# Patient Record
Sex: FEMALE | Race: OTHER | HISPANIC OR LATINO | URBAN - METROPOLITAN AREA
[De-identification: names, ages, dates, MRNs, and addresses within clinical notes are randomized per-mention and may not be internally consistent; named-entity substitution may affect disease eponyms.]

---

## 2022-08-15 ENCOUNTER — APPOINTMENT (EMERGENCY)
Dept: RADIOLOGY | Facility: HOSPITAL | Age: 67
End: 2022-08-15
Payer: COMMERCIAL

## 2022-08-15 ENCOUNTER — APPOINTMENT (EMERGENCY)
Dept: CT IMAGING | Facility: HOSPITAL | Age: 67
End: 2022-08-15
Payer: COMMERCIAL

## 2022-08-15 ENCOUNTER — HOSPITAL ENCOUNTER (EMERGENCY)
Facility: HOSPITAL | Age: 67
Discharge: HOME | End: 2022-08-15
Attending: EMERGENCY MEDICINE | Admitting: EMERGENCY MEDICINE
Payer: COMMERCIAL

## 2022-08-15 VITALS
OXYGEN SATURATION: 98 % | WEIGHT: 113.98 LBS | SYSTOLIC BLOOD PRESSURE: 188 MMHG | DIASTOLIC BLOOD PRESSURE: 88 MMHG | HEART RATE: 81 BPM | RESPIRATION RATE: 18 BRPM | TEMPERATURE: 97.6 F

## 2022-08-15 DIAGNOSIS — S01.81XA: ICD-10-CM

## 2022-08-15 DIAGNOSIS — S02.2XXA NASAL BONE FRACTURE: ICD-10-CM

## 2022-08-15 DIAGNOSIS — S00.31XA NASAL ABRASION: ICD-10-CM

## 2022-08-15 DIAGNOSIS — S29.019A THORACIC MYOFASCIAL STRAIN: ICD-10-CM
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
## 2022-08-15 DIAGNOSIS — S09.90XA CHI (CLOSED HEAD INJURY): Primary | ICD-10-CM

## 2022-08-15 PROCEDURE — G1004 CDSM NDSC: HCPCS

## 2022-08-15 PROCEDURE — 73564 X-RAY EXAM KNEE 4 OR MORE: CPT

## 2022-08-15 PROCEDURE — 12052 INTMD RPR FACE/MM 2.6-5.0 CM: CPT | Performed by: EMERGENCY MEDICINE

## 2022-08-15 PROCEDURE — 99284 EMERGENCY DEPT VISIT MOD MDM: CPT | Performed by: EMERGENCY MEDICINE

## 2022-08-15 PROCEDURE — 72131 CT LUMBAR SPINE W/O DYE: CPT

## 2022-08-15 PROCEDURE — 99284 EMERGENCY DEPT VISIT MOD MDM: CPT

## 2022-08-15 PROCEDURE — 70450 CT HEAD/BRAIN W/O DYE: CPT

## 2022-08-15 PROCEDURE — 72125 CT NECK SPINE W/O DYE: CPT

## 2022-08-15 PROCEDURE — 72128 CT CHEST SPINE W/O DYE: CPT

## 2022-08-15 PROCEDURE — 70486 CT MAXILLOFACIAL W/O DYE: CPT

## 2022-08-15 RX ORDER — GINSENG 100 MG
2 CAPSULE ORAL ONCE
Status: COMPLETED | OUTPATIENT
Start: 2022-08-15 | End: 2022-08-15

## 2022-08-15 RX ORDER — LIDOCAINE HYDROCHLORIDE AND EPINEPHRINE 10; 10 MG/ML; UG/ML
10 INJECTION, SOLUTION INFILTRATION; PERINEURAL ONCE
Status: COMPLETED | OUTPATIENT
Start: 2022-08-15 | End: 2022-08-15

## 2022-08-15 RX ADMIN — LIDOCAINE HYDROCHLORIDE,EPINEPHRINE BITARTRATE 10 ML: 10; .01 INJECTION, SOLUTION INFILTRATION; PERINEURAL at 19:35

## 2022-08-15 RX ADMIN — BACITRACIN ZINC 2 SMALL APPLICATION: 500 OINTMENT TOPICAL at 19:35

## 2022-08-15 NOTE — ED PROVIDER NOTES
History  Chief Complaint   Patient presents with    Fall     Patient arrives via ems c/o a fall while walking her dog  Patient states going through a construction site and a big machine was driving so she backed up but the  motioned for her to proceed so she did and fell forward  Lac on forehead and nose, abrasions on right knee  Denies loc, unsure about thinnners     68y F biba for evaluation after a mechanical fall pta  Was walking near a construction site when she tripped, fell forward onto knees and face  No LOC  Able to get up on her own  C/o facial pain - forehead and nose  Denies any neck pain  C/o mid back pain (around T7-8), worse w/ movement, no low back pain  Has r>l knee pain, worse w/ movement  Overall pain moderate  Declines pain medication    Noted to have damage to her glasses, but reports she is having some trouble focusing that she thinks is not related to her glasses  Denies double vision  No epistaxis, no pain to lips/teeth    Denies cp, no sob/melgar, no n/v, no other co      History provided by:  Patient and medical records   used: Yes ([id number])    Fall  Mechanism of injury: fall    Injury location:  Face, torso and leg  Facial injury location:  Face and nose  Torso injury location:  Back  Leg injury location:  R knee and L knee  Incident location:  Outdoors  Arrived directly from scene: yes    Fall:     Fall occurred:  Tripped    Height of fall:  Ground level fall    Impact surface:  [place]    Point of impact:  Face and knees    Entrapped after fall: no    Protective equipment: none    Suspicion of alcohol use: no    Suspicion of drug use: no    Tetanus status:  Up to date (2016)  Prior to arrival data:     Bystander interventions:  None    Blood loss:  Minimal    Responsiveness at scene:  Alert    Orientation at scene:  Person, place, situation and time    Loss of consciousness: no      Amnesic to event: no    Associated symptoms: back pain and headaches Associated symptoms: no abdominal pain, no chest pain, no difficulty breathing, no loss of consciousness, no nausea, no neck pain and no vomiting    Risk factors: no anticoagulation therapy (no anti-platelet therapy)        None       History reviewed  No pertinent past medical history  History reviewed  No pertinent surgical history  History reviewed  No pertinent family history  I have reviewed and agree with the history as documented  E-Cigarette/Vaping     E-Cigarette/Vaping Substances     Social History     Tobacco Use    Smoking status: Never Smoker    Smokeless tobacco: Never Used   Substance Use Topics    Alcohol use: Never    Drug use: Never       Review of Systems   Cardiovascular: Negative for chest pain  Gastrointestinal: Negative for abdominal pain, nausea and vomiting  Musculoskeletal: Positive for back pain  Negative for neck pain  Neurological: Positive for headaches  Negative for loss of consciousness  All other systems reviewed and are negative  Physical Exam  Physical Exam  Vitals and nursing note reviewed  Constitutional:       Appearance: Normal appearance  HENT:      Head: Normocephalic  No raccoon eyes or Pfeiffer's sign  Right Ear: No hemotympanum  Left Ear: No hemotympanum  Nose: Signs of injury and nasal tenderness present  No nasal deformity or septal deviation  Right Nostril: No epistaxis or septal hematoma  Left Nostril: No epistaxis or septal hematoma  Mouth/Throat:      Mouth: Mucous membranes are moist       Comments: No malocclusion  Eyes:      Extraocular Movements: Extraocular movements intact  Conjunctiva/sclera: Conjunctivae normal       Pupils: Pupils are equal, round, and reactive to light  Cardiovascular:      Rate and Rhythm: Normal rate and regular rhythm  Heart sounds: No murmur heard  Pulmonary:      Effort: Pulmonary effort is normal       Breath sounds: Normal breath sounds     Chest:      Chest wall: No tenderness  Abdominal:      Palpations: Abdomen is soft  Tenderness: There is no abdominal tenderness  Musculoskeletal:         General: Tenderness present  Cervical back: Tenderness and bony tenderness present  Spinous process tenderness and muscular tenderness present  Thoracic back: Bony tenderness present  Lumbar back: No bony tenderness  Back:       Right knee: Swelling and bony tenderness (patella) present  No deformity or ecchymosis  Decreased range of motion  Tenderness present  No medial joint line or lateral joint line tenderness  Left knee: No swelling or bony tenderness  Normal range of motion  No tenderness  Skin:     General: Skin is warm  Findings: Abrasion present  Neurological:      General: No focal deficit present  Mental Status: She is alert and oriented to person, place, and time  Cranial Nerves: Cranial nerves are intact  Sensory: Sensation is intact  Motor: Motor function is intact     Psychiatric:         Mood and Affect: Mood normal          Vital Signs  ED Triage Vitals [08/15/22 1707]   Temperature Pulse Respirations Blood Pressure SpO2   97 6 °F (36 4 °C) 81 18 (!) 188/88 98 %      Temp Source Heart Rate Source Patient Position - Orthostatic VS BP Location FiO2 (%)   Oral Monitor Lying Right arm --      Pain Score       --           Vitals:    08/15/22 1707   BP: (!) 188/88   Pulse: 81   Patient Position - Orthostatic VS: Lying         Visual Acuity  Visual Acuity    Flowsheet Row Most Recent Value   L Pupil Size (mm) 3   R Pupil Size (mm) 3          ED Medications  Medications   lidocaine-epinephrine (XYLOCAINE/EPINEPHRINE) 1 %-1:100,000 injection 10 mL (10 mL Infiltration Given by Other 8/15/22 1935)   bacitracin topical ointment 2 small application (2 small application Topical Given 8/15/22 1935)       Diagnostic Studies  Results Reviewed     None                 XR knee 4+ views Right injury   ED Interpretation by Claudia Viveros DO (08/15 1842)   No acute findings      CT spine thoracic & lumbar wo contrast   ED Interpretation by Claudia Viveros DO (08/15 1931)   See below      Final Result by Carlos Tobar MD (08/15 1853)      No evidence of acute thoracic or lumbar spine fracture  Workstation performed: [id number]         CT head without contrast   ED Interpretation by Claudia Viveros DO (08/15 1843)   See below      Final Result by Sarah Germain DO (08/15 1832)      No acute intracranial abnormality  Workstation performed: [id number]         CT facial bones without contrast   ED Interpretation by Claudia Viveros DO (08/15 1911)   See below      Final Result by Carlos Tobar MD (08/15 1851)      Left nasal bone fracture  Workstation performed: [id number]         CT cervical spine without contrast   ED Interpretation by Claudia Viveros DO (08/15 1931)   See below      Final Result by Carlos Tobar MD (08/15 1855)      No acute cervical spine fracture or traumatic malalignment  Workstation performed: [id number]                    Procedures  Laceration repair    Date/Time: 8/15/2022 8:25 PM  Performed by: Claudia Viveros DO  Authorized by: Claudia Viveros DO   Consent: Verbal consent obtained    Consent given by: patient  Patient identity confirmed: verbally with patient  Body area: head/neck  Location details: forehead  Laceration length: 3 cm  Tendon involvement: none  Nerve involvement: none  Vascular damage: no  Anesthesia: local infiltration    Anesthesia:  Local Anesthetic: lidocaine 1% with epinephrine  Anesthetic total: 8 mL    Sedation:  Patient sedated: no      Wound Dehiscence:    Secondary closure or dehiscence: complex    Procedure Details:  Irrigation solution: saline  Amount of cleaning: standard  Debridement: none  Degree of undermining: minimal  Skin closure: 6-0 nylon  Subcutaneous closure: 5-0 Vicryl  Number of sutures: 12 (9 nylon interrupted for skin closure, 3 SQ vicryl)  Technique: simple and buried suture  Approximation: close  Approximation difficulty: complex  Dressing: antibiotic ointment  Patient tolerance: patient tolerated the procedure well with no immediate complications  Foreign body: no foreign bodies/debris appreciated  ED Course  ED Course as of 08/15/22 2234   Mon Aug 15, 2022   1931 CT spine thoracic & lumbar wo contrast  TT sent to radiology (Dr Romain Galo)   2005 D/w pt rad results  Reports known issue w/ thoracic vertebral bodies from 2014 (Concavity of the superior T8 and T9 endplates and small Schmorl's nodes, consistent with degenerative change on CT tonight)   2050 D/w pt wound care, follow up     Given return precautions, wound care instructions / suture removal information in both english and Mont[place]erratian                               SBIRT 20yo+    Flowsheet Row Most Recent Value   SBIRT (23 yo +)    In order to provide better care to our patients, we are screening all of our patients for alcohol and drug use  Would it be okay to ask you these screening questions? Yes Filed at: 08/15/2022 1711   Initial Alcohol Screen: US AUDIT-C     1  How often do you have a drink containing alcohol? 0 Filed at: 08/15/2022 1711   2  How many drinks containing alcohol do you have on a typical day you are drinking? 0 Filed at: 08/15/2022 1711   3a  Male UNDER 65: How often do you have five or more drinks on one occasion? 0 Filed at: 08/15/2022 1711   3b  FEMALE Any Age, or MALE 65+: How often do you have 4 or more drinks on one occassion? 0 Filed at: 08/15/2022 1711   Audit-C Score 0 Filed at: 08/15/2022 1711   DARRIN: How many times in the past year have you    Used an illegal drug or used a prescription medication for non-medical reasons?  Never Filed at: 08/15/2022 1711                    MDM  Number of Diagnoses or Management Options  CHI (closed head injury): new and requires workup  Complex laceration of forehead: new and requires workup  Fall from standing: new and requires workup  Nasal abrasion: new and requires workup  Nasal bone fracture: new and requires workup  Thoracic myofascial strain: new and requires workup  Diagnosis management comments: Mechanical fall       Amount and/or Complexity of Data Reviewed  Tests in the radiology section of CPT®: ordered and reviewed  Independent visualization of images, tracings, or specimens: yes        Disposition  Final diagnoses:   CHI (closed head injury)   Complex laceration of forehead   Nasal bone fracture   Nasal abrasion   Thoracic myofascial strain   Fall from standing     Time reflects when diagnosis was documented in both MDM as applicable and the Disposition within this note     Time User Action Codes Description Comment    8/15/2022  8:59 PM Colon Dory Add [S09 90XA] CHI (closed head injury)     8/15/2022  8:59 PM Leonarda Jeffery Add [S01 81XA] Complex laceration of forehead     8/15/2022  9:00 PM Leonarda Jeffery Add [S02  2XXA] Nasal bone fracture     8/15/2022  9:00 PM Leonarda Jeffery Add [S00 31XA] Nasal abrasion     8/15/2022  9:01 PM Leonarda Jeffery Add [U98 925A] Thoracic myofascial strain     8/15/2022  9:05 PM Leonarda Jeffery Add [O28  XXXA] Fall from standing       ED Disposition     ED Disposition   Discharge    Condition   Stable    Date/Time   Mon Aug 15, 2022  8:59 PM    Comment   Renetta Crespo discharge to home/self care  Follow-up Information     Follow up With Specialties Details Why Contact Info    Knute Kawasaki, [facility], Nurse Practitioner Schedule an appointment as soon as possible for a visit on 8/22/2022 For suture removal [address] [facility]  [phone]            There are no discharge medications for this patient  No discharge procedures on file      PDMP Review     None          ED Provider  Electronically Signed by           Dilcia Tinoco [address] [facility], DO  08/15/22 2549

## 2022-08-16 NOTE — DISCHARGE INSTRUCTIONS
Mantenga chakraborty vendaje puesto hasta el ghassan por la noche; después de [place], lave el área suavemente con agua y [place], [place] pequeña cantidad de pomada antibiótica tópica y [place] con un vendaje  A partir del miércoles, sigue lavándote a diario con agua y [place], mario puedes dejarlo al aire o cubrirlo con un vendaje seco (no más pomada antibiótica tópica)  Dorothy un seguimiento con chakraborty médico de atención primaria el lunes para que le quiten los puntos  Regrese al departamento de emergencias o consulte a chakraborty médico de atención primaria antes si tiene enrojecimiento, hinchazón, drenaje, fiebre de más de 100 4 o si tiene alguna inquietud  Puede alternar paracetamol 1000 mg e ibuprofeno 600 mg cada 3 horas para el dolor  Trate de programar chakraborty ibuprofeno para que pueda tomarlo cuando coma  Además, puede aplicar hielo chandrakant 15 a 20 minutos cada 1 a 2 horas mientras está despierto para un control adicional del dolor  Keep your dressing on until Tuesday evening - after you remove it, wash the area gently with soap and water, apply a small amount of topical antibiotic ointment, and cover with a bandage  Starting Wednesday, continue to wash daily with soap and water, but you can leave it open to air or cover with a dry bandage (no more topical antibiotic ointment)  Follow up with your primary care doctor on Monday for removal of your stitches  Return to the Emergency Department or see your primary care doctor sooner for any redness, swelling, drainage, fever more than 100 4, or for any concerns  You can alternate acetaminophen 1000mg and ibuprofen 600mg every 3 hours for pain  Try to time your ibuprofen so you can take it when you eat  Additionally you can apply ice for 15-20 minutes every 1-2 hours while awake for additional pain control

## 2022-08-22 ENCOUNTER — HOSPITAL ENCOUNTER (EMERGENCY)
Facility: HOSPITAL | Age: 67
Discharge: HOME | End: 2022-08-22
Attending: EMERGENCY MEDICINE
Payer: COMMERCIAL

## 2022-08-22 VITALS
TEMPERATURE: 98.2 F | DIASTOLIC BLOOD PRESSURE: 72 MMHG | OXYGEN SATURATION: 98 % | HEART RATE: 74 BPM | RESPIRATION RATE: 18 BRPM | WEIGHT: 114.2 LBS | SYSTOLIC BLOOD PRESSURE: 159 MMHG

## 2022-08-22 DIAGNOSIS — L08.9 WOUND INFECTION: ICD-10-CM

## 2022-08-22 DIAGNOSIS — T14.8XXA WOUND INFECTION: ICD-10-CM

## 2022-08-22 DIAGNOSIS — Z48.02 ENCOUNTER FOR REMOVAL OF SUTURES: Primary | ICD-10-CM

## 2022-08-22 PROCEDURE — 99281 EMR DPT VST MAYX REQ PHY/QHP: CPT | Performed by: PHYSICIAN ASSISTANT

## 2022-08-22 RX ORDER — BACITRACIN, NEOMYCIN, POLYMYXIN B 400; 3.5; 5 [USP'U]/G; MG/G; [USP'U]/G
1 OINTMENT TOPICAL ONCE
Status: COMPLETED | OUTPATIENT
Start: 2022-08-22 | End: 2022-08-22

## 2022-08-22 RX ORDER — CEPHALEXIN 500 MG/1
500 CAPSULE ORAL EVERY 6 HOURS SCHEDULED
Qty: 28 CAPSULE | Refills: 0 | Status: SHIPPED | OUTPATIENT
Start: 2022-08-22 | End: 2022-08-29

## 2022-08-22 RX ADMIN — BACITRACIN ZINC, NEOMYCIN, POLYMYXIN B 1 SMALL APPLICATION: 400; 3.5; 5 OINTMENT TOPICAL at 14:54

## 2022-08-22 NOTE — DISCHARGE INSTRUCTIONS
Take Keflex as prescribed for full 7 days  Keep the area clean, gently wash with soap and water  Apply Neosporin up to 2 times a day and keep the area protected from the sun    Follow-up with PCP for monitoring of symptoms  Return to ED if symptoms worsen including increasing pain, swelling, pus draining from the wound, fevers, chills, headache, vision changes, dizziness, numbness, tingling, weakness

## 2022-08-22 NOTE — ED PROVIDER NOTES
History  Chief Complaint   Patient presents with    Suture / Staple Removal     Pt here to have stitches removed on her forehead that were placed last Monday, tried to get into her pcp to have them removed but there are no appts      Patient is a 56-year-old female who presents for suture removal on her forehead  Patient was seen in the ED on 08/15, 7 days ago, for mechanical fall with facial laceration and abrasion  Patient had CT head and face with left nasal bone fracture, no other acute findings  Patient had a total of 12 sutures place, 9 superficially for forehead laceration  Patient states the area has been healing well and denies any swelling, purulent drainage, fevers, chills, diaphoresis  Today patient did note some mild redness around the wound  Patient states she is otherwise in her usual state of health and denies any headache, nausea, vomiting  Patient does note intermittent dizziness, which only occurs with position change, but states this is unchanged from her baseline prior to the fall  She denies any associated room spinning, vision changes, double vision, neck pain or stiffness, numbness, tingling, focal weakness  Patient denies any congestion, cough, shortness of breath, chest pain, palpitations, abdominal pain, diarrhea, urinary changes  None       No past medical history on file  No past surgical history on file  No family history on file  I have reviewed and agree with the history as documented  E-Cigarette/Vaping     E-Cigarette/Vaping Substances     Social History     Tobacco Use    Smoking status: Never Smoker    Smokeless tobacco: Never Used   Substance Use Topics    Alcohol use: Never    Drug use: Never       Review of Systems   Constitutional: Negative for chills, diaphoresis and fever  HENT: Negative for congestion  Eyes: Negative for photophobia and visual disturbance  Respiratory: Negative for cough and shortness of breath      Cardiovascular: Negative for chest pain  Gastrointestinal: Negative for abdominal pain, diarrhea, nausea and vomiting  Genitourinary: Negative for difficulty urinating, dysuria and hematuria  Musculoskeletal: Negative for myalgias, neck pain and neck stiffness  Skin: Positive for wound (Forehead laceration, healing  Nasal abrasion, healing)  Negative for color change, pallor and rash  Neurological: Positive for dizziness (baseline, unchanged)  Negative for weakness, light-headedness, numbness and headaches  All other systems reviewed and are negative  Physical Exam  Physical Exam  Vitals and nursing note reviewed  Constitutional:       General: She is awake  She is not in acute distress  Appearance: She is well-developed  She is not ill-appearing, toxic-appearing or diaphoretic  HENT:      Head: Normocephalic  Laceration present  Right Ear: External ear normal       Left Ear: External ear normal       Nose: Signs of injury (Healing nasal abrasion  No surrounding swelling, erythema, purulent drainage noted ) present  Eyes:      General: Vision grossly intact  No scleral icterus  Extraocular Movements: Extraocular movements intact  Conjunctiva/sclera: Conjunctivae normal       Pupils: Pupils are equal, round, and reactive to light  Neck:      Vascular: No JVD  Trachea: No tracheal deviation  Cardiovascular:      Rate and Rhythm: Normal rate and regular rhythm  Heart sounds: Normal heart sounds  Pulmonary:      Effort: Pulmonary effort is normal  No tachypnea or respiratory distress  Breath sounds: Normal breath sounds  Musculoskeletal:         General: No deformity  Normal range of motion  Cervical back: Normal range of motion and neck supple  Comments: Neurovascularly intact, moving all extremities freely, ambulating without issue  Skin:     General: Skin is dry  Neurological:      General: No focal deficit present        Mental Status: She is alert and oriented to person, place, and time  GCS: GCS eye subscore is 4  GCS verbal subscore is 5  GCS motor subscore is 6  Cranial Nerves: Cranial nerves are intact  Sensory: Sensation is intact  Motor: Motor function is intact  Coordination: Coordination is intact  Psychiatric:         Behavior: Behavior normal  Behavior is cooperative  Vital Signs  ED Triage Vitals [08/22/22 1344]   Temperature Pulse Respirations Blood Pressure SpO2   98 2 °F (36 8 °C) 74 18 159/72 98 %      Temp src Heart Rate Source Patient Position - Orthostatic VS BP Location FiO2 (%)   -- -- Sitting Right arm --      Pain Score       --           Vitals:    08/22/22 1344   BP: 159/72   Pulse: 74   Patient Position - Orthostatic VS: Sitting         Visual Acuity      ED Medications  Medications   neomycin-bacitracin-polymyxin b (NEOSPORIN) ointment 1 small application (1 small application Topical Given 8/22/22 1454)       Diagnostic Studies  Results Reviewed     None                 No orders to display              Procedures  Suture removal    Date/Time: 8/22/2022 2:45 PM  Performed by: Jeff Escamilla PA-C  Authorized by: Jeff Escamilla PA-C   Universal Protocol:  Consent: Verbal consent obtained  Risks and benefits: risks, benefits and alternatives were discussed  Consent given by: patient  Time out: Immediately prior to procedure a "time out" was called to verify the correct patient, procedure, equipment, support staff and site/side marked as required  Timeout called at: 8/22/2022 2:35 PM   Patient understanding: patient states understanding of the procedure being performed  Patient identity confirmed: verbally with patient        Patient location:  ED  Location:     Laterality:  Median    Location:  [address] [facility] location:  Forehead  Procedure details:      Tools used:  Suture removal kit    Wound appearance:  Red, warm, good wound healing, clean and nonpurulent    Number of sutures removed: 9  Post-procedure details:     Post-removal:  Antibiotic ointment applied    Patient tolerance of procedure: Tolerated well, no immediate complications             ED Course                               SBIRT 20yo+    Flowsheet Row Most Recent Value   SBIRT (25 yo +)    In order to provide better care to our patients, we are screening all of our patients for alcohol and drug use  Would it be okay to ask you these screening questions? No Filed at: 08/22/2022 0562                    [facility]  Number of Diagnoses or Management Options  Encounter for removal of sutures  Wound infection  Diagnosis management comments: Sutures successfully removed, patient tolerated procedure well  Given trace erythema and warmth, will cover for possible early wound infection  Reviewed medication education, treatment at home  Recommended follow-up with PCP for monitoring of wound healing and symptoms  Patient with chronic history of dizziness, which patient states is unchanged by recent events, consistent with prior outpatient visits in chart review  The management plan was discussed in detail with the patient at bedside and all questions were answered  Provided both verbal and written instructions  Reviewed red flag symptoms and strict return to ED instructions  Patient notes understanding and agrees to plan  Disposition  Final diagnoses:   Encounter for removal of sutures   Wound infection     Time reflects when diagnosis was documented in both MDM as applicable and the Disposition within this note     Time User Action Codes Description Comment    8/22/2022  2:48 PM Janice Cota Add [M92 84] Encounter for removal of sutures     8/22/2022  2:49 PM Grabiel Galo  8XXA,  L08 9] Wound infection       ED Disposition     ED Disposition   Discharge    Condition   Stable    Date/Time   Mon Aug 22, 2022  2:49 PM    Comment   Freida Crespo discharge to home/self care                 Follow-up Information     Follow up With Specialties Details Why Contact Info Additional [address] [facility] Emergency Department Emergency Medicine  If symptoms worsen [place] [id number]  [address] [facility] Emergency Department, [address]  , [place], [place], [address]    Follow-up with PCP for monitoring of symptoms               Discharge Medication List as of 8/22/2022  2:56 PM      START taking these medications    Details   cephalexin (KEFLEX) 500 mg capsule Take 1 capsule (500 mg total) by mouth every 6 (six) hours for 7 days, Starting Mon 8/22/2022, Until Mon 8/29/2022, Print             No discharge procedures on file      PDMP Review     None          ED Provider  Electronically Signed by           Sully Gonsalves PA-C  08/22/22 4789

## 2022-08-22 NOTE — Clinical Note
Greg Galicia was seen and treated in our emergency department on 8/22/2022  Diagnosis:     Freida    She may return on this date: 08/23/2022         If you have any questions or concerns, please don't hesitate to call        Avery Farias PA-C    ______________________________           _______________          _______________  Hospital Representative                              Date                                Time

## 2022-12-01 ENCOUNTER — TELEPHONE (OUTPATIENT)
Dept: OBGYN CLINIC | Facility: HOSPITAL | Age: 67
End: 2022-12-01

## 2022-12-01 NOTE — TELEPHONE ENCOUNTER
Caller: Yong Naylor from [facility]    Doctor: N/A    Reason for call: Yong Naylor calling to verify is patient is being seen by [facility] [address]  Verified patient was seen in ER in [facility] and not seen by orthopedics

## 2024-04-21 ENCOUNTER — APPOINTMENT (EMERGENCY)
Dept: RADIOLOGY | Facility: HOSPITAL | Age: 69
End: 2024-04-21
Payer: MEDICARE

## 2024-04-21 ENCOUNTER — APPOINTMENT (EMERGENCY)
Dept: CT IMAGING | Facility: HOSPITAL | Age: 69
End: 2024-04-21
Payer: MEDICARE

## 2024-04-21 ENCOUNTER — HOSPITAL ENCOUNTER (EMERGENCY)
Facility: HOSPITAL | Age: 69
Discharge: HOME | End: 2024-04-21
Attending: INTERNAL MEDICINE | Admitting: INTERNAL MEDICINE
Payer: MEDICARE

## 2024-04-21 VITALS
OXYGEN SATURATION: 99 % | DIASTOLIC BLOOD PRESSURE: 72 MMHG | TEMPERATURE: 97.9 F | HEART RATE: 78 BPM | SYSTOLIC BLOOD PRESSURE: 131 MMHG | WEIGHT: 113.8 LBS | RESPIRATION RATE: 16 BRPM

## 2024-04-21 DIAGNOSIS — R14.0 ABDOMINAL BLOATING: Primary | ICD-10-CM

## 2024-04-21 LAB
ALBUMIN SERPL BCP-MCNC: 4.1 G/DL (ref 3.5–5)
ALP SERPL-CCNC: 80 U/L (ref 34–104)
ALT SERPL W P-5'-P-CCNC: 9 U/L (ref 7–52)
ANION GAP SERPL CALCULATED.3IONS-SCNC: 8 MMOL/L (ref 4–13)
AST SERPL W P-5'-P-CCNC: 15 U/L (ref 13–39)
BASOPHILS # BLD AUTO: 0.02 THOUSANDS/ÂΜL (ref 0–0.1)
BASOPHILS NFR BLD AUTO: 0 % (ref 0–1)
BILIRUB SERPL-MCNC: 0.38 MG/DL (ref 0.2–1)
BNP SERPL-MCNC: 49 PG/ML (ref 0–100)
BUN SERPL-MCNC: 15 MG/DL (ref 5–25)
CALCIUM SERPL-MCNC: 8.7 MG/DL (ref 8.4–10.2)
CARDIAC TROPONIN I PNL SERPL HS: <2 NG/L
CARDIAC TROPONIN I PNL SERPL HS: <2 NG/L
CHLORIDE SERPL-SCNC: 104 MMOL/L (ref 96–108)
CO2 SERPL-SCNC: 28 MMOL/L (ref 21–32)
CREAT SERPL-MCNC: 0.67 MG/DL (ref 0.6–1.3)
EOSINOPHIL # BLD AUTO: 0.27 THOUSAND/ÂΜL (ref 0–0.61)
EOSINOPHIL NFR BLD AUTO: 5 % (ref 0–6)
ERYTHROCYTE [DISTWIDTH] IN BLOOD BY AUTOMATED COUNT: 11.8 % (ref 11.6–15.1)
GFR SERPL CREATININE-BSD FRML MDRD: 89 ML/MIN/1.73SQ M
GLUCOSE SERPL-MCNC: 156 MG/DL (ref 65–140)
HCT VFR BLD AUTO: 36.6 % (ref 34.8–46.1)
HGB BLD-MCNC: 12.7 G/DL (ref 11.5–15.4)
IMM GRANULOCYTES # BLD AUTO: 0.01 THOUSAND/UL (ref 0–0.2)
IMM GRANULOCYTES NFR BLD AUTO: 0 % (ref 0–2)
LIPASE SERPL-CCNC: 54 U/L (ref 11–82)
LYMPHOCYTES # BLD AUTO: 1.07 THOUSANDS/ÂΜL (ref 0.6–4.47)
LYMPHOCYTES NFR BLD AUTO: 18 % (ref 14–44)
MAGNESIUM SERPL-MCNC: 2 MG/DL (ref 1.9–2.7)
MCH RBC QN AUTO: 31.4 PG (ref 26.8–34.3)
MCHC RBC AUTO-ENTMCNC: 34.7 G/DL (ref 31.4–37.4)
MCV RBC AUTO: 90 FL (ref 82–98)
MONOCYTES # BLD AUTO: 0.4 THOUSAND/ÂΜL (ref 0.17–1.22)
MONOCYTES NFR BLD AUTO: 7 % (ref 4–12)
NEUTROPHILS # BLD AUTO: 4.27 THOUSANDS/ÂΜL (ref 1.85–7.62)
NEUTS SEG NFR BLD AUTO: 70 % (ref 43–75)
NRBC BLD AUTO-RTO: 0 /100 WBCS
PLATELET # BLD AUTO: 177 THOUSANDS/UL (ref 149–390)
PMV BLD AUTO: 11.4 FL (ref 8.9–12.7)
POTASSIUM SERPL-SCNC: 3.7 MMOL/L (ref 3.5–5.3)
PROT SERPL-MCNC: 6.9 G/DL (ref 6.4–8.4)
RBC # BLD AUTO: 4.05 MILLION/UL (ref 3.81–5.12)
SODIUM SERPL-SCNC: 140 MMOL/L (ref 135–147)
WBC # BLD AUTO: 6.04 THOUSAND/UL (ref 4.31–10.16)

## 2024-04-21 PROCEDURE — 99285 EMERGENCY DEPT VISIT HI MDM: CPT

## 2024-04-21 PROCEDURE — 74177 CT ABD & PELVIS W/CONTRAST: CPT

## 2024-04-21 PROCEDURE — 85025 COMPLETE CBC W/AUTO DIFF WBC: CPT

## 2024-04-21 PROCEDURE — 83880 ASSAY OF NATRIURETIC PEPTIDE: CPT

## 2024-04-21 PROCEDURE — 71045 X-RAY EXAM CHEST 1 VIEW: CPT

## 2024-04-21 PROCEDURE — 84484 ASSAY OF TROPONIN QUANT: CPT

## 2024-04-21 PROCEDURE — 93005 ELECTROCARDIOGRAM TRACING: CPT

## 2024-04-21 PROCEDURE — 83690 ASSAY OF LIPASE: CPT

## 2024-04-21 PROCEDURE — 83735 ASSAY OF MAGNESIUM: CPT

## 2024-04-21 PROCEDURE — 80053 COMPREHEN METABOLIC PANEL: CPT

## 2024-04-21 PROCEDURE — 36415 COLL VENOUS BLD VENIPUNCTURE: CPT

## 2024-04-21 RX ORDER — SODIUM CHLORIDE 9 MG/ML
3 INJECTION INTRAVENOUS
Status: DISCONTINUED | OUTPATIENT
Start: 2024-04-21 | End: 2024-04-21 | Stop reason: HOSPADM

## 2024-04-21 RX ADMIN — IOHEXOL 80 ML: 350 INJECTION, SOLUTION INTRAVENOUS at 14:42

## 2024-04-21 NOTE — Clinical Note
Freida Crespo was seen and treated in our emergency department on 4/21/2024.    No restrictions            Diagnosis:     Freida  .    She may return on this date: 04/22/2024         If you have any questions or concerns, please don't hesitate to call.      Soren Petty PA-C    ______________________________           _______________          _______________  Hospital Representative                              Date                                Time

## 2024-04-22 LAB
ATRIAL RATE: 70 BPM
ATRIAL RATE: 70 BPM
P AXIS: 56 DEGREES
P AXIS: 58 DEGREES
PR INTERVAL: 146 MS
PR INTERVAL: 152 MS
QRS AXIS: 33 DEGREES
QRS AXIS: 36 DEGREES
QRSD INTERVAL: 62 MS
QRSD INTERVAL: 64 MS
QT INTERVAL: 370 MS
QT INTERVAL: 398 MS
QTC INTERVAL: 399 MS
QTC INTERVAL: 429 MS
T WAVE AXIS: 86 DEGREES
T WAVE AXIS: 87 DEGREES
VENTRICULAR RATE: 70 BPM
VENTRICULAR RATE: 70 BPM

## 2024-04-22 PROCEDURE — 93010 ELECTROCARDIOGRAM REPORT: CPT | Performed by: INTERNAL MEDICINE
